# Patient Record
(demographics unavailable — no encounter records)

---

## 2025-06-12 NOTE — REVIEW OF SYSTEMS
[Feeling Fatigued] : feeling fatigued [Palpitations] : palpitations [Anxiety] : anxiety [Fever] : no fever [Headache] : no headache [Chills] : no chills [Blurry Vision] : no blurred vision [Seeing Double (Diplopia)] : no diplopia [SOB] : no shortness of breath [Dyspnea on exertion] : not dyspnea during exertion [Chest Discomfort] : no chest discomfort [Lower Ext Edema] : no extremity edema [Leg Claudication] : no intermittent leg claudication [Orthopnea] : no orthopnea [PND] : no PND [Syncope] : no syncope [Cough] : no cough [Wheezing] : no wheezing [Dizziness] : no dizziness [Tremor] : no tremor was seen [Numbness (Hypoesthesia)] : no numbness [Convulsions] : no convulsions [Tingling (Paresthesia)] : no tingling [Weakness] : no weakness [Limb Weakness (Paresis)] : no limb weakness (Paresis) [Speech Disturbance] : no speech disturbance [Confusion] : no confusion was observed [Memory Lapses Or Loss] : no memory lapses or loss [Depression] : no depression [Under Stress] : not under stress [Suicidal] : not suicidal [Easy Bleeding] : no tendency for easy bleeding [Swollen Glands] : no swollen glands [Easy Bruising] : no tendency for easy bruising

## 2025-06-12 NOTE — HISTORY OF PRESENT ILLNESS
[FreeTextEntry1] : YG LOPES is a 34 y.o. F with medical history significant for anxiety with "attacks", 12 wks gestations She is here for cardiac evaluation palpitations.   She reports having on and of palpitations during the past 3 mo. Occurs almost daily. Lasts up to a couple of min. a.w LH, diaphoresis and SOB. Not a.w syncope.  Usually associated with anxious thoughts.  She reports being more fatigue with exertion since being pregnant.  Denies CP, SOB, orthopnea, dizziness, syncope, LH, edema  Patient denies changes in her exercise tolerance during the past 6 months. she can walk 10+ blocks and can climb 3 flights of stairs.  Sleeps with 1 pillow  She denies history of MI, CVA, DM, smoking  Denies family history of premature ASCVD and /or SCD  No hospitalizations in the past 3 years.   Data  ECG (6/12/25): NSR at 90 bpm w nl axis and no STT abn   LABS (5/30/25): TSH 0.18; Hgb 13.8; Hct 41.4; Plt 251; A1c 5;

## 2025-06-12 NOTE — ASSESSMENT
[FreeTextEntry1] : YG LOPES is a 34 year F with past medical history significant for anxiety disorder, 12wks gestation. She is here for cardiac evaluation of palpitations and fatigue, as described above. ECG is normal.   - I reviewed ECG - place a holter x 1 wk - Schedule an echo to evaluate for structural heart disease - follow up TSH  - Heart healthy diet low in sodium, sugars and saturated fats and high in fruits, vegetables and whole grains.    Patient advised to go to the nearest ED whenever any symptoms persist and/or worsens.  Patient/Family/Caregiver verbalized complete understanding of these instructions.  I spent a total of 45 minutes with more than 50% spent on counseling and coordinating care.     RTO after test results are available.

## 2025-06-26 NOTE — ASSESSMENT
[FreeTextEntry1] : -- Toxoplasma labs from [facility] in [place] were reviewed with the patient and her .  These lab results taken together show evidence for very recent Toxoplasma infection, either shortly before or even after conception.   LMP 3/17/2025.   Patient did take a trip to [place] in February 2025 when she did interact and pet wild cats at the resort.  She also does have an adult cat at home (3 yo) and did change the litter box before she knew she was pregnant.  She is reporting a few days where she felt unwell in mid April with fatigue and chills but also reports that she feels this way at least once per year.  Given that her cat is a healthy adult cat that has been in her care and does not leave the home, Toxoplasma infection in this cat is actually less likely.  I suspect the exposure at the [place] resort is the more likely source of infection.   There is also the risk of foodborne exposure which could occur from the resort food or even food here in the  at home or at restaurants.  We often do not identify the source of infection.  Regardless there are case reports of congenital Toxoplasmosis infection occurring when the mother is infected within up to 4 months prior to conception, and these labs support an acute infection likely in that time period.  We had a long discussion regarding the risk of infection to the fetus.  Transmission to the fetus is less common in the first trimester than in later trimesters, but when transmission does occur in the first trimester the sequelae of infection tend to be more severe.  So far ultrasounds have not identified any abnormalities in the fetus but it is early in the pregnancy yet.     We discussed the use of spiramycin in pregnancy to prevent transmission of Toxoplasmosis to the fetus.  This is typically started as soon as infection is known and can be continued throughout pregnancy.  However, spiramycin does not cross the placenta.  Therefore, to treat the fetus directly if there is concern for infection, the standard of care is a combination of pyrimethamine/sulfadiazine/leucovorin (P/S/L).   This regimen is used in the 2nd and 3rd trimester of pregnancy, and can be offered starting at 14 weeks gestation in patients with high risk (such as this patient) and/or with signs of infection in the fetus.  Amniocentesis with Toxoplasma PCR testing sent on amniotic fluid is recommended at 18 weeks gestation.  If PCR positive, this P/S/L regimen is used for the remainder of pregnancy.  If PCR negative, the next step decisions are made with shared medical decision making (ie: change back to spiramycin, continue P/S/L, or stop medication altogether) and often depends on fetal ultrasounds at the time of decision making as well.  Pyrimethamine has become difficult to procure and very expensive, but there is a "Dar[facility]rim [employer]" program available to patients with commercial insurance that makes this more accessible.  P/S/L can also be difficult to tolerate, most notably due to GI side effects of nausea and vomiting.  More and more trimethoprim/sulfamethoxazole = Bactrim is being used for treatment in pregnant women instead when (A) pyrimethamine is not available or cost-prohibitive  and/or (B)  P/S/L is not tolerated.    Bactrim is used often in adults that are not pregnant for Toxoplasmosis infection in cases such as with chorioretinitis or CNS disease.  Bactrim is used in pregnant women in 2nd and 3rd trimesters for infections such as UTI.   However Bactrim is not well studied specifically for treatment of Toxoplasmosis in pregnant women.  There is a group in [place] using combination spiramycin plus Bactrim that has published two reports with success in this approach.  PMID [id number] and [id number].  All of the above was explained to the patient and her  and they had opportunity to ask questions that I answered to the best of my ability.  Due to the acuity of Toxoplasma infection evidenced by the serology results I do consider this fetus at significant risk for congenital infection.  Since we are not able to discern whether or not the fetus is already infected at this point, and the patient is now in her second trimester today at 14 weeks, 2 days gestation, I am recommending to use a regimen that is known to cross the placenta.  Spiramycin is also on back order and the patient desires to start some treatment as soon as possible.  We will start the process of procuring pyrimethamine from "[facility] [employer]" and I will order the sulfadiazine and leucovorin to the patient's local pharmacy so they can order this in.  In the meantime, we will start Bactrim as this can be started tonight.  RECOMMEND: - prescribed Bactrim DS tab po BID, start tonight 6/25/25 -CBC with Diff, CMP, G6PD labs today  Await pyrimethamine/sulfadiazine/leucovorin (P/S/L) The form for "[facility]" was completed and faxed on 6/25/25, e-prescribe for sulfadiazine and leucovorin was done 6/26/25 to patient's preferred pharmacy. When all meds are procured and patient has in her possession, we can stop the Bactrim and instead start P/S/L: -pyrimethamine load with 50mg po BID x 2 days, then continue with 50mg po daily -sulfadiazine load with 4000mg po once, then continue with 1000mg po TID -leucovorin 25mg po daily  -agree with anatomy scan ultrasound at 16 weeks gestation  -agree with amniocentesis at 18 weeks gestation, send for Toxoplasma PCR on amniotic fluid  -continue P/S/L until results of Toxoplasma PCR are known and decision making is determined regarding next steps  -agree with complete anatomy scan ultrasound at 20 weeks gestation  I will plan to call the patient next week to check in and will schedule formal follow-up when I return the office 7/16/25.  Zeke Lepe MD, MS Attending - Infectious Disease

## 2025-06-26 NOTE — PHYSICAL EXAM
[General Appearance - Alert] : alert [General Appearance - Well Nourished] : well nourished [General Appearance - Well-Appearing] : healthy appearing [Oriented To Time, Place, And Person] : oriented to person, place, and time [FreeTextEntry1] : anxious, tearful